# Patient Record
Sex: MALE | Race: BLACK OR AFRICAN AMERICAN | NOT HISPANIC OR LATINO | URBAN - METROPOLITAN AREA
[De-identification: names, ages, dates, MRNs, and addresses within clinical notes are randomized per-mention and may not be internally consistent; named-entity substitution may affect disease eponyms.]

---
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
## 2025-04-25 ENCOUNTER — OFFICE VISIT (OUTPATIENT)
Dept: INTERNAL MEDICINE | Facility: CLINIC | Age: 20
End: 2025-04-25
Payer: COMMERCIAL

## 2025-04-25 VITALS
HEART RATE: 61 BPM | DIASTOLIC BLOOD PRESSURE: 70 MMHG | BODY MASS INDEX: 30.84 KG/M2 | WEIGHT: 240.31 LBS | SYSTOLIC BLOOD PRESSURE: 124 MMHG | OXYGEN SATURATION: 98 % | HEIGHT: 74 IN

## 2025-04-25 DIAGNOSIS — Z02.0 SCHOOL PHYSICAL EXAM: Primary | ICD-10-CM

## 2025-04-25 PROCEDURE — 99999 PR PBB SHADOW E&M-EST. PATIENT-LVL III: CPT | Mod: PBBFAC,,, | Performed by: STUDENT IN AN ORGANIZED HEALTH CARE EDUCATION/TRAINING PROGRAM

## 2025-04-25 NOTE — PROGRESS NOTES
"[facility]  Subjective:     Patient ID: Zacarias Davis is a 19 y.o. male.  History of Present Illness            Presents for PPD skin test. Has no other complaints. 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 He reports PPD or TB blood test are both acceptable for his institution.     He is a non-smoker. Does not drink alcohol heavily.     He has successfully lost weight since his last primary care visit in 2023.       Current Outpatient Medications   Medication Instructions    EPINEPHrine (EPIPEN 2-LUCILLE) 0.3 mg, Intramuscular, Once     Objective:      Body mass index is 31.27 kg/m².  Vitals:    04/25/25 0833   BP: 124/70   Pulse: 61   SpO2: 98%   Weight: 109 kg (240 lb 4.8 oz)   Height: 6' 1.5" (1.867 m)   PainSc: 0-No pain     Physical Exam  Constitutional:       Appearance: Normal appearance.   Cardiovascular:      Rate and Rhythm: Normal rate.      Heart sounds: No murmur heard.  Pulmonary:      Effort: Pulmonary effort is normal. No respiratory distress.   Abdominal:      General: Abdomen is flat.   Musculoskeletal:      Right lower leg: No edema.      Left lower leg: No edema.   Skin:     General: Skin is warm.   Neurological:      General: No focal deficit present.      Mental Status: He is alert.        Physical Exam            Assessment:       1. School physical exam        Plan:   Impression (dictated)   Plan (software generated and edited)   Assessment & Plan           PPD test today, return to read on Monday.   Ordered quantiferon gold for him to do if needed.   Offered STI screening but patient declined.       School physical exam  -     QUANTIFERON GOLD TB; Future; Expected date: 04/25/2025  -     Comprehensive Metabolic Panel; Future; Expected date: 04/25/2025  -     tuberculin injection 5 Units  -     POCT TB Skin Test Read        All of your core healthy metrics are met.    No follow-ups on file. or sooner " prn (as needed)          Alvin Epperson  [facility]  [address]  [address]  [place] [address]  Phone [phone]  Fax [phone]    Part of this note is dictated using the M*Modal Fluency Direct word recognition program. It may contain word recognition mistakes or wrong word substitutions (commonly he/she and is/was substitutions) that were missed on review.    Part of this note was generated with the assistance of ambient listening technology. Verbal consent was obtained by the patient and accompanying visitor(s) for the recording of patient appointment to facilitate this note. I attest to having reviewed and edited the generated note for accuracy, though some syntax or spelling errors may persist. Please contact the author of this note for any clarification.

## 2025-04-25 NOTE — PROGRESS NOTES
PPD Placement note  Zacarias Davis, 19 y.o. male is here today for placement of PPD test  Reason for PPD test: School  Pt taken PPD test before: no  Verified in allergy area and with patient that they are not allergic to the products PPD is made of (Phenol or Tween). Yes   Is patient taking any oral or IV steroid medication now or have they taken it in the last month? no  Has the patient ever received the BCG vaccine?: no  Has the patient been in recent contact with anyone known or suspected of having active TB disease?: no       Date of exposure (if applicable): N/A       Name of person they were exposed to (if applicable): N/A  Patient's Country of origin?: [place]  O: Alert and oriented in NAD.  P:  PPD placed on 4/25/2025.  Patient advised to return for reading within 48-72 hours. Placed to Left Arm before 0915  Hanna Mora, RN, MSN

## 2025-04-28 ENCOUNTER — CLINICAL SUPPORT (OUTPATIENT)
Dept: INTERNAL MEDICINE | Facility: CLINIC | Age: 20
End: 2025-04-28
Payer: COMMERCIAL

## 2025-04-28 ENCOUNTER — RESULTS FOLLOW-UP (OUTPATIENT)
Dept: INTERNAL MEDICINE | Facility: CLINIC | Age: 20
End: 2025-04-28

## 2025-04-28 DIAGNOSIS — Z02.0 SCHOOL PHYSICAL EXAM: Primary | ICD-10-CM

## 2025-04-28 NOTE — PROGRESS NOTES
PPD Reading Note  PPD read and results entered in [employer].  Result: 0 mm induration.  Interpretation: negative  Allergic reaction: no

## 2025-04-28 NOTE — LETTER
April 28, 2025         [facility]  [address]  [facility] [address]  Phone: [phone]  Fax: [phone] April 28, 2025       Patient: Zacarias Davis   YOB: 2005   Date of Visit: 4/28/2025   To Whom It May Concern:    Our clinic recently performed a tuberculosis skin test on one of your employees, Zacarias Davis. The results of this test are as follows:    PPD Test Value         4/28/2025         Tb Skin Test Negative          Current PPD Immunization       Name Date Dose VIS Date Route    PPD Test 4/25/2025 5 Units N/A Intradermal    Site: Right arm    Given By: Hanna Mora RN    : [employer]    Lot: [id number]    Expiration Date: 11/30/2027          This test was negative for tuberculosis exposure per current Centers for Disease Control guidelines.    A chest x-ray was not required.    If you have any questions or concerns, please don't hesitate to call.    Sincerely,        NURSE, [facility] INTER[facility]